# Patient Record
Sex: FEMALE | Race: WHITE | Employment: OTHER | URBAN - METROPOLITAN AREA
[De-identification: names, ages, dates, MRNs, and addresses within clinical notes are randomized per-mention and may not be internally consistent; named-entity substitution may affect disease eponyms.]

---

## 2021-09-23 ENCOUNTER — APPOINTMENT (OUTPATIENT)
Dept: GENERAL RADIOLOGY | Age: 86
End: 2021-09-23
Attending: EMERGENCY MEDICINE

## 2021-09-23 ENCOUNTER — HOSPITAL ENCOUNTER (EMERGENCY)
Age: 86
Discharge: HOME | End: 2021-09-23
Attending: EMERGENCY MEDICINE

## 2021-09-23 VITALS
DIASTOLIC BLOOD PRESSURE: 91 MMHG | RESPIRATION RATE: 16 BRPM | HEIGHT: 66 IN | BODY MASS INDEX: 20.09 KG/M2 | TEMPERATURE: 97.6 F | OXYGEN SATURATION: 97 % | HEART RATE: 98 BPM | SYSTOLIC BLOOD PRESSURE: 167 MMHG | WEIGHT: 125 LBS

## 2021-09-23 DIAGNOSIS — S20.219A CONTUSION OF CHEST WALL, UNSPECIFIED LATERALITY, INITIAL ENCOUNTER: ICD-10-CM
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
## 2021-09-23 DIAGNOSIS — S22.42XA: ICD-10-CM

## 2021-09-23 LAB
ATRIAL RATE: 86 BPM
CALCULATED P AXIS, ECG09: 62 DEGREES
CALCULATED R AXIS, ECG10: 17 DEGREES
CALCULATED T AXIS, ECG11: 44 DEGREES
DIAGNOSIS, 93000: NORMAL
P-R INTERVAL, ECG05: 194 MS
Q-T INTERVAL, ECG07: 368 MS
QRS DURATION, ECG06: 70 MS
QTC CALCULATION (BEZET), ECG08: 440 MS
VENTRICULAR RATE, ECG03: 86 BPM

## 2021-09-23 PROCEDURE — 93005 ELECTROCARDIOGRAM TRACING: CPT

## 2021-09-23 PROCEDURE — 99283 EMERGENCY DEPT VISIT LOW MDM: CPT

## 2021-09-23 PROCEDURE — 71045 X-RAY EXAM CHEST 1 VIEW: CPT

## 2021-09-23 PROCEDURE — 74011250637 HC RX REV CODE- 250/637: Performed by: EMERGENCY MEDICINE

## 2021-09-23 RX ORDER — ACETAMINOPHEN 325 MG/1
650 TABLET ORAL ONCE
Status: COMPLETED | OUTPATIENT
Start: 2021-09-23 | End: 2021-09-23

## 2021-09-23 RX ADMIN — ACETAMINOPHEN 650 MG: 325 TABLET ORAL at 17:37
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
## 2021-09-23 NOTE — ED NOTES
Patient discharged home following routine work found to be within defined limits. Medications, fluids and treatments tolerated well by patient. Normal vital signs. Reviewed follow up instructions and medications with patient and son. Extensive conversation regarding return precautions discussed. Patient and son acknowledged understanding. All personal belongings taken by patient.